# Patient Record
Sex: MALE | Race: WHITE
[De-identification: names, ages, dates, MRNs, and addresses within clinical notes are randomized per-mention and may not be internally consistent; named-entity substitution may affect disease eponyms.]

---

## 2018-01-06 ENCOUNTER — HOSPITAL ENCOUNTER (EMERGENCY)
Dept: HOSPITAL 95 - ER | Age: 78
Discharge: HOME | End: 2018-01-06
Payer: MEDICARE

## 2018-01-06 VITALS — HEIGHT: 66 IN | WEIGHT: 208.01 LBS | BODY MASS INDEX: 33.43 KG/M2

## 2018-01-06 DIAGNOSIS — Z91.018: ICD-10-CM

## 2018-01-06 DIAGNOSIS — Z88.8: ICD-10-CM

## 2018-01-06 DIAGNOSIS — Z79.899: ICD-10-CM

## 2018-01-06 DIAGNOSIS — I25.2: ICD-10-CM

## 2018-01-06 DIAGNOSIS — R07.9: Primary | ICD-10-CM

## 2018-01-06 DIAGNOSIS — Z88.5: ICD-10-CM

## 2018-01-06 DIAGNOSIS — Z95.0: ICD-10-CM

## 2018-01-06 DIAGNOSIS — Z79.01: ICD-10-CM

## 2018-01-06 LAB
ALBUMIN SERPL BCP-MCNC: 3.1 G/DL (ref 3.4–5)
ALBUMIN/GLOB SERPL: 0.8 {RATIO} (ref 0.8–1.8)
ALT SERPL W P-5'-P-CCNC: 23 U/L (ref 12–78)
ANION GAP SERPL CALCULATED.4IONS-SCNC: 7 MMOL/L (ref 6–16)
AST SERPL W P-5'-P-CCNC: 18 U/L (ref 12–37)
BASOPHILS # BLD AUTO: 0.03 K/MM3 (ref 0–0.23)
BASOPHILS NFR BLD AUTO: 0 % (ref 0–2)
BILIRUB SERPL-MCNC: 0.5 MG/DL (ref 0.1–1)
BUN SERPL-MCNC: 29 MG/DL (ref 8–24)
CALCIUM SERPL-MCNC: 8.3 MG/DL (ref 8.5–10.1)
CHLORIDE SERPL-SCNC: 111 MMOL/L (ref 98–108)
CO2 SERPL-SCNC: 22 MMOL/L (ref 21–32)
CREAT SERPL-MCNC: 1.71 MG/DL (ref 0.6–1.2)
DEPRECATED RDW RBC AUTO: 53.1 FL (ref 35.1–46.3)
EOSINOPHIL # BLD AUTO: 0.18 K/MM3 (ref 0–0.68)
EOSINOPHIL NFR BLD AUTO: 3 % (ref 0–6)
ERYTHROCYTE [DISTWIDTH] IN BLOOD BY AUTOMATED COUNT: 14.4 % (ref 11.7–14.2)
GLOBULIN SER CALC-MCNC: 3.8 G/DL (ref 2.2–4)
GLUCOSE SERPL-MCNC: 107 MG/DL (ref 70–99)
HCT VFR BLD AUTO: 34.7 % (ref 37–53)
HGB BLD-MCNC: 11.4 G/DL (ref 13.5–17.5)
IMM GRANULOCYTES # BLD AUTO: 0.02 K/MM3 (ref 0–0.1)
IMM GRANULOCYTES NFR BLD AUTO: 0 % (ref 0–1)
LYMPHOCYTES # BLD AUTO: 1.6 K/MM3 (ref 0.84–5.2)
LYMPHOCYTES NFR BLD AUTO: 23 % (ref 21–46)
MCHC RBC AUTO-ENTMCNC: 32.9 G/DL (ref 31.5–36.5)
MCV RBC AUTO: 102 FL (ref 80–100)
MONOCYTES # BLD AUTO: 0.96 K/MM3 (ref 0.16–1.47)
MONOCYTES NFR BLD AUTO: 14 % (ref 4–13)
NEUTROPHILS # BLD AUTO: 4.1 K/MM3 (ref 1.96–9.15)
NEUTROPHILS NFR BLD AUTO: 60 % (ref 41–73)
NRBC # BLD AUTO: 0 K/MM3 (ref 0–0.02)
NRBC BLD AUTO-RTO: 0 /100 WBC (ref 0–0.2)
PLATELET # BLD AUTO: 188 K/MM3 (ref 150–400)
POTASSIUM SERPL-SCNC: 4.5 MMOL/L (ref 3.5–5.5)
PROT SERPL-MCNC: 6.9 G/DL (ref 6.4–8.2)
SODIUM SERPL-SCNC: 140 MMOL/L (ref 136–145)
TROPONIN I SERPL-MCNC: <0.015 NG/ML (ref 0–0.04)

## 2020-09-07 NOTE — NUR
SHIFT SUMMARY
PT ALERT AND ORIENTED. VS STABLE. O2 SATS HAVE REMAINED ABOVE 90% ON RA. BP
STABLE. HR PACED. PT ABLE TO AMBULATE TO BATHROOM WITH SBA AND CANE. PT DENIES
ANY PAIN THIS SHIFT. STATUS CHANGED TO MEDICAL THIS AM. WILL CONTINUE TO
MONITOR AND REPORT TO ONCOMING RN.
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
## 2020-09-08 NOTE — NUR
DISCHARGE
PT DISCHARGED HOME FROM UNIT AT APROX 1345. PT GIVEN WRITTEN AND VERBAL
DISCHARGE INSTUCTIONS AND VERBALIZED UNDERSTANDING OF THESE INSTRUCTIONS. IV
REMOVED, PT TOLERATED WELL. [facility] FOR TRANSPORT.

## 2020-09-08 NOTE — NUR
SHIFT SUMMARY
NO ACUTE CHANGES THIS SHIFT. PT A/OX4 WITH VSS. MEDICATED
WITH 5MG NORCO AND 0.25MCG IV FENT FOR BREAKTHROUGH PAIN X1 R/T CHRONIC LOWER
BACK PAIN. GAUZE/TAPE DRESSING TO RUQ CDI. AMBULATING IND WITH SBA/CANE/GB. PT
EAGER FOR D/C HOME, IF POSSIBLE. PT CURRENTLY RESTING IN BED WITH CALL LIGHT
IN REACH. WILL CONT TO MONITOR PATIENT FOR CHANGES AND GIVE REPORT TO ONCOMING
RN.

## 2020-09-08 NOTE — NUR
TRANSFER TO SURGICAL UNIT
PT ARRIVED AT APPROX 2345 TODAY VIA HOSPITAL BED FROM PCU. REPORT RECEIVED
FROM SHELDON SANTOS PRIOR TO TRANSFER. PT IS A/OX4 WITH VSS. DENIES
SOB, CP, DISCOMFORT, N/T OR N/V. AMBULATES TO BATHROOM WITH SBA, GB, AND CANE.
ROOM ORIENTATION PROVIDED. WARM BLANKET AND CHAPSTICK PROVIDED PER PT REQUEST
AT THIS TIME. PT IS CURRENTLY RESTING IN BED WITH CALL LIGHT IN REACH.  WILL
CONT TO MONITOR FOR CHANGES.

## 2021-01-14 DIAGNOSIS — Z23 NEED FOR VACCINATION: ICD-10-CM

## 2021-11-08 NOTE — NUR
SHIFT SUMMARY
PT IS AWAITING POSSIBLE TRANSFER FOR ERCP.  PT HAS DENIED ABD PAIN SINCE HE
ARRIVED.  HE IS NPO.  PT IS A 1 ASSIST WITH GAIT BELT AND CANE WHEN OOB.
REPORT GIVEN TO NOC RN.

## 2021-11-09 NOTE — NUR
PATIENT WENT TO CATH LAB TODAY FOR BILILARY DRAIN PLACEMENT. PATIENT RETURNED
AT ABOUT 1730, AWAKE AND ALERT. ABLE TO MAKE NEEDS AND WANTS  KNOWN. DRESSING
TO DRAIN CDI, ABDOMENT SOFT AND TENDER. PATIENT COMPLAINS OF PAIN AT INSERTION
SITE OF DRAIN. PATIENT REMAINS NPO WITH ICE CHIPS. CALL LIGHT IN EASY REACH.
REPORT GIVEN TO SHELDON MILLS

## 2021-11-09 NOTE — NUR
PT IS A/O X2-3 WITH SOME FORGETFULLNESS. IS ABLE TO MAKE HIS NEEDS NKOWN. IS
[place], HEARING AIDS AT BEDSIDE & KEPT IN SPECIMEN CUP FOR SAFE KEEPING. NO
EVENTS OVERNIGHT. CONT TO BE NPO W/ICE CHIPS & MEDS. NO N/V DURING
NOC. Bactrim Counseling:  I discussed with the patient the risks of sulfa antibiotics including but not limited to GI upset, allergic reaction, drug rash, diarrhea, dizziness, photosensitivity, and yeast infections.  Rarely, more serious reactions can occur including but not limited to aplastic anemia, agranulocytosis, methemoglobinemia, blood dyscrasias, liver or kidney failure, lung infiltrates or desquamative/blistering drug rashes.

## 2021-11-10 NOTE — NUR
PATIENT DISCHARGED TO [facility] AT THIS TIME VIA STRETCHER IN AMBULANCE.
REPORT GIVEN TO EMT'S
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
## 2021-11-10 NOTE — NUR
PT IS A/OX2 W/SOME FORGETFULLNESS. ABLE TO MAKE HIS NEEDS KNOWN. NO EVENTS
OVER NIGHT. DRESSING TO RT BILIARY DRAIN INSERTION SITE IS CDI. BILIARY OUTPUT
IS DARK BROWN/GREEN W/TOTAL 320ML OUTPUT ON NOC. ABD SOFT, TENDER W/PALPATION.
BT'S POS X4. DENIED ANY N/V. HAD BM. CONTINUES W/NPO STATUS W/ICE CHIPS.

## 2021-11-10 NOTE — NUR
REPORT CALLED TO SHELDON PHIPPS AT [facility] -[phone]. PATIENT WILL BE
ADMITTED TO ROOM 6106.